# Patient Record
Sex: MALE | Race: WHITE | Employment: UNEMPLOYED | URBAN - METROPOLITAN AREA
[De-identification: names, ages, dates, MRNs, and addresses within clinical notes are randomized per-mention and may not be internally consistent; named-entity substitution may affect disease eponyms.]

---

## 2023-08-06 ENCOUNTER — TRANSFERRED RECORDS (OUTPATIENT)
Dept: HEALTH INFORMATION MANAGEMENT | Facility: CLINIC | Age: 33
End: 2023-08-06

## 2023-09-05 ENCOUNTER — TELEPHONE (OUTPATIENT)
Dept: TRANSPLANT | Facility: CLINIC | Age: 33
End: 2023-09-05
Payer: MEDICARE

## 2023-09-05 ENCOUNTER — TRANSCRIBE ORDERS (OUTPATIENT)
Dept: ONCOLOGY | Facility: CLINIC | Age: 33
End: 2023-09-05
Payer: MEDICARE

## 2023-09-05 DIAGNOSIS — C92.00 AML (ACUTE MYELOGENOUS LEUKEMIA) (H): Primary | ICD-10-CM

## 2023-09-05 DIAGNOSIS — C95.00 ACUTE LEUKEMIA NOT HAVING ACHIEVED REMISSION (H): Primary | ICD-10-CM

## 2023-10-02 ENCOUNTER — CARE COORDINATION (OUTPATIENT)
Dept: TRANSPLANT | Facility: CLINIC | Age: 33
End: 2023-10-02
Payer: MEDICARE

## 2023-10-02 DIAGNOSIS — C92.01 ACUTE MYELOID LEUKEMIA IN REMISSION (H): Primary | ICD-10-CM

## 2023-10-02 NOTE — PROGRESS NOTES
[facility] BMT and Cell Therapy Program  RN Coordinator Pre-Visit Documentation      Abel Degroot is a 32 year old male who has been referred to the [facility] BMT and Cell Therapy Program for hematopoietic cell transplant or immune effector cell therapy.      Referring MD Name: Celso Aguayo    Referring RN Coordinator: n/a    Reason for referral: Allo BMT consult for AML    Link to BMT & CT Program Algorithms      For allos only:    Previous HLA typing? No    Previous formal donor search? No    PRA needed? Yes    CMV IGG needed? Yes    and ABO needed? Yes    Potential family donors to type? Yes - per notes, patient has at least one sister    Need URD consents? Yes    For CAR-T Candidates Only:    Orders placed for virologies: N/A      All relevant clinical notes, labs, imaging, and pathology may be reviewed in Epic Bookmarks under name: Sophie Grady      Patient Care Team         Relationship Specialty Notifications Start End    Maryan Oconnell PA-C PCP - General   7/2/15     Referring to [facility] (Merged)    Phone: [phone] Fax: [phone]         [facility] [address] [facility] [address]    Maryan Oconnell PA-C    7/2/15     Referring to [facility] (Merged)    Phone: [phone] Fax: [phone]         [facility] [address] [facility] [address]    Soumya Camarillo MD Registered Nurse   7/2/15     Soumya Camarillo MD Registered Nurse   7/2/15               Sophie Grady, RAFA

## 2023-10-03 ENCOUNTER — MEDICAL CORRESPONDENCE (OUTPATIENT)
Dept: TRANSPLANT | Facility: CLINIC | Age: 33
End: 2023-10-03
Payer: MEDICARE

## 2023-10-12 ENCOUNTER — CARE COORDINATION (OUTPATIENT)
Dept: TRANSPLANT | Facility: CLINIC | Age: 33
End: 2023-10-12
Payer: MEDICARE

## 2023-10-12 NOTE — PROGRESS NOTES
[facility] BMT and Cell Therapy Program  RN Coordinator Pre-Visit Documentation      Abel Degorot is a 32 year old male who has been referred to the [facility] BMT and Cell Therapy Program for hematopoietic cell transplant or immune effector cell therapy.      Referring MD Name: Viktoriya Bang, telephone [phone]     Reason for referral: allo HSCT    Link to BMT & CT Program Algorithms      For allos only:    Previous HLA typing? No    Previous formal donor search? No    PRA needed? Yes    CMV IGG needed? Yes    and ABO needed? Yes    Potential family donors to type? Yes, kit sent to sister    Need URD consents? Yes    For CAR-T Candidates Only:    Orders placed for virologies: N/A      All relevant clinical notes, labs, imaging, and pathology may be reviewed in Epic Bookmarks under name: MURALI Nuñez      Patient Care Team         Relationship Specialty Notifications Start End    Maryan Oconnell PA-C PCP - General   7/2/15     Referring to [facility] (Merged)    Phone: [phone] Fax: [phone]         [facility] [address] [facility] [address]    Maryan Oconnell PA-C    7/2/15     Referring to [facility] (Merged)    Phone: [phone] Fax: [phone]         [facility] [address] [facility] [address]    Soumya Camarillo MD Registered Nurse   7/2/15     Soumya Camarillo MD Registered Nurse   7/2/15     Jenny Mccormack RN BMT Nurse Coordinator   10/12/23               Jenny Mccormack RN

## 2023-10-26 ENCOUNTER — LAB (OUTPATIENT)
Dept: LAB | Facility: CLINIC | Age: 33
End: 2023-10-26
Payer: MEDICARE

## 2023-10-26 DIAGNOSIS — C92.00 ACUTE MYELOID LEUKEMIA (H): Primary | ICD-10-CM

## 2023-10-26 PROCEDURE — 88321 CONSLTJ&REPRT SLD PREP ELSWR: CPT | Performed by: PATHOLOGY

## 2023-10-27 LAB
PATH REPORT.COMMENTS IMP SPEC: ABNORMAL
PATH REPORT.COMMENTS IMP SPEC: YES
PATH REPORT.FINAL DX SPEC: ABNORMAL
PATH REPORT.GROSS SPEC: ABNORMAL
PATH REPORT.RELEVANT HX SPEC: ABNORMAL
PATH REPORT.SITE OF ORIGIN SPEC: ABNORMAL
PATH REPORT.SUPPLEMENTAL REPORTS SPEC: ABNORMAL

## 2023-11-06 ENCOUNTER — MEDICAL CORRESPONDENCE (OUTPATIENT)
Dept: TRANSPLANT | Facility: CLINIC | Age: 33
End: 2023-11-06
Payer: MEDICARE